# Patient Record
Sex: FEMALE | Race: WHITE | NOT HISPANIC OR LATINO | Employment: UNEMPLOYED | URBAN - METROPOLITAN AREA
[De-identification: names, ages, dates, MRNs, and addresses within clinical notes are randomized per-mention and may not be internally consistent; named-entity substitution may affect disease eponyms.]

---

## 2020-02-11 DIAGNOSIS — M79.7 FIBROMYALGIA: Primary | ICD-10-CM

## 2020-02-18 ENCOUNTER — CLINICAL SUPPORT (OUTPATIENT)
Dept: REHABILITATION | Facility: HOSPITAL | Age: 60
End: 2020-02-18
Payer: MEDICAID

## 2020-02-18 DIAGNOSIS — R52 CHRONIC GENERALIZED PAIN: Primary | ICD-10-CM

## 2020-02-18 DIAGNOSIS — G89.29 CHRONIC GENERALIZED PAIN: Primary | ICD-10-CM

## 2020-02-18 PROCEDURE — 97162 PT EVAL MOD COMPLEX 30 MIN: CPT

## 2020-02-18 NOTE — PLAN OF CARE
"[facility] OUTPATIENT THERAPY  Physical Therapy Initial Evaluation    Name: Rosanna Marie  Clinic Number: [id number]    Therapy Diagnosis:   Encounter Diagnosis   Name Primary?    Chronic generalized pain Yes     Physician: Surinder Caballero,*    Physician Orders: PT Eval and Treat   Medical Diagnosis from Referral: Fibromyalgia  Evaluation Date: 2/18/2020  Authorization Period Expiration: 2/10/2021  Plan of Care Expiration: 4/24/2020  Visit # / Visits authorized: 1/ 1 (freq: Ev + 12)    Precautions: Standard and blood thinners    Subjective   Date of onset: Exacerbation 2/11/2020    History of current condition - Rosanna reports: that she had a general follow up with her PCP and states that she has had a decline in her function due to her generalized pain.  She was referred to [facility] for Eval.  Patient adds that she has difficulty with prolonged standing, walking,  bending/lifting, difficulty with shopping, ADL's with sitting in tub for bathing as well as showering, disturbed sleep with a frequency of 3 x a night x 3 days a week.       Medical History:   No past medical history on file.    Surgical History:   Rosanna Marie  has no past surgical history on file.    Medications:   Rosanna currently has no medications in their medication list.    Allergies:   Review of patient's allergies indicates:  Allergies not on file     Imaging, none:     Prior Therapy: N/A  Social History:  lives with their family in a single story [facility] home.  Occupation: out of work  Prior Level of Function: Independent  Current Level of Function: Independent with pain and assistance by family.       Pain:  Current 4/10, worst 9/10, best 3/10   Location: Generalized pain generalized   Description: sun burn type and very sensitive.  Aggravating Factors: General activites  Easing Factors: pain medication    Pts goals: "I dont know why she sent me hear, she didn't know what else to do with me"    Objective     Posture: " forward head and slouched posture    General Objective           Flexibility      Muscle Left Right Comment         Pectoralis Minor Moderate tightness Moderate tightness    Quadriceps Moderate tightness Moderate tightness    Hamstrings -20* -20*    Piriformis Mod Tightness Mod Tightness                      ROM       Upper Ext. AROM AROM  Status Comment    Left Right              Flexion WFL WFL      Extension WFL WFL      ABduction WFL WFL      Medial Rotation WFL WFL      External Rotation WFL WFL                      Lower Ext.                Hip flexion WFL WFL      Hip Ext WFL WFL      Hip AB WFL WFL      Hip AD WFL WFL      Hip IR WFL WFL      Hip ER WFL WFL                      Knee flex WFL WFL      Knee ext WFL WFL      DF WFL WFL      PF WFL WFL                          MMT          Upper Ext.    Status Comment    Left Right              Shoulder Flexion 3+/5 3+/5      Shoulder Extension 4/5 4/5      Shoulder ABduction 3+/5 3+/5      Medial Rotation 4-/5 4-/5      External Rotation 4-/5 4-/5                      LT 2+/5 2+/5      MT 2+/5 2+/5      Rhomboids 3+/5 3+/5                      Lower Ext.                Hip flexion 3+/5 3+/5      Hip Ext 3+/5 3+/5      Hip AB 3+/5 3+/5      Hip AD 2+/5 2+/5      Hip IR 3+/5 3+/5      Hip ER 3+/5 3+/5      Glut Max 4-/5 4-/5              Knee flex        Knee ext 4/5 4/5      DF 4+/5 4+/5                       FUNCTIONAL TESTS      LEFS: 32/80 = 40% Function    UEFS: 53/80 = 66.25% Function           FUNCTIONAL MOBILITY        Balance: Non remarkable        Gait: Patient amb with diminished heel strike, toe off, TKE, hip ext, and with a flexed posture and head down.  Additionally, she has a decreased velocity and patricia.          Transfer: Mod I with UE use from chair and mat      Time In: 0805  Time Out: 0905      Assessment       Rosanna is a 59 y.o. female who is currently out of work and is presenting to OP Physical Therapy for initial evaluation on 2/18/2020  with a MD Dx of Fibromyalgia .  Patient exhibits increased pain, decreased flexibility, strength, activity saurabh and currently reports a limited functioinal level of 40% and 66.25% as per the LEFS and UEFS respectively.  All of which contributes to her general debility with limited function that she related with prolonged standing, walking, bending, ADLs- bathing, T/F into tub bottom, as well as difficulty with house hold and community mob.  The following co-morbid conditions/personal factors may affect the care plan: hx of fibromyalgia may limit her optimal ability to heal.  The patient's clinical characteristics are evolving.   Patient would benefit from skilled Physical Therapy to address HER noted deficits.      Pt prognosis is Fair.   Pt will benefit from skilled outpatient Physical Therapy to address the deficits stated above and in the chart below, provide pt/family education, and to maximize pt's level of independence.     Plan of care discussed with patient: Yes  Pt's spiritual, cultural and educational needs considered and patient is agreeable to the plan of care and goals as stated below:         Goals:  Patient will: STG/LTG Status   1a demonstrate initial HEP with use of handout prn in order to optimize Rx outcomes and max. Functional mob.in 4 visits. STG    2a relate her worst generalized pain to </= 8/10 in order to improve QoL for improved ability to perform her ADL's, prolonged standing, amb and to improve restful sleep in 6 visits. STG    3a demonstrate B hamstring length to </= - 15* (IE -20*) in order to improve TKE and heel strike with amb. In 6 visits.   STG    4a. Improve B LE gross strength to 4-/5 (IE 3+/5) in order to improve ability to perform prolonged standing, amb, ADLS, house hold and community mob. In 7 visits.   STG    5a Improve B UE gross strength to 4-/5 (IE 3+/5) in order to improve ability to perform her ADLs lifting , reaching and her day to day tasks in 7 visits.   STG    6a.  Improve her function to >/= 50% (IE 40%) as per the LEFS in order to improve ability to perform her ADLs, prolonged standing/walking, and her day to day tasks and community mob. in 7 visits.  STG    7a   Improve her function to >/= 75% (IE 66.25%) as per the UEFS in order to improve ability to perform her lifting , reaching and her day to day tasks in 7 visits. STG              1b demonstrate final HEP with use of handout prn in order to optimize Rx outcomes and max. Functional mob.by discharge. LTG    2b relate her worst generalized pain to </= 7/10 in order to improve QoL for improved ability to perform her ADL's, prolonged standing, amb and to improve restful sleep in 12 visits. LTG    3b demonstrate B hamstring length to </= - 10* (IE -20*) in order to improve TKE and heel strike with amb. In 12 visits.  LTG    4b  Improve B LE gross strength to 4/5 (IE 3+/5) in order to improve ability to perform prolonged standing, amb, ADLS, house hold and community mob. In 12 visits.   LTG    5b  Improve B UE gross strength to 4/5 (IE 3+/5) in order to improve ability to perform her ADLs lifting , reaching and her day to day tasks in 12 visits. LTG    6b  Improve her function to >/= 55% (IE 40%) as per the LEFS in order to improve ability to perform her ADLs, prolonged standing/walking, and her day to day tasks and community mob. in 12 visits. LTG    7b  Improve her function to >/= 80% (IE 66.25%) as per the UEFS in order to improve ability to perform her lifting , reaching and her day to day tasks in 12 visits. LTG                                 Anticipated Barriers for therapy: Med hx, lifestyle    Medical Necessity is demonstrated by the following  History  Co-morbidities and personal factors that may impact the plan of care Co-morbidities:   high BMI    Personal Factors:   lifestyle     low   Examination  Body Structures and Functions, activity limitations and participation restrictions that may impact the plan of care  Body Regions:   back  lower extremities  upper extremities  trunk    Body Systems:    ROM  strength  gait  transfers  transitions    Participation Restrictions:   None    Activity limitations:   Learning and applying knowledge  no deficits    General Tasks and Commands  no deficits    Communication  no deficits    Mobility  lifting and carrying objects  walking    Self care  washing oneself (bathing, drying, washing hands)    Domestic Life  shopping  cooking  doing house work (cleaning house, washing dishes, laundry)    Interactions/Relationships  no deficits    Life Areas  no deficits    Community and Social Life  community life  recreation and leisure         moderate   Clinical Presentation evolving clinical presentation with changing clinical characteristics moderate   Decision Making/ Complexity Score: moderate         Plan       POC valid from 2/18/2020 through 4/24/2020. 2Times a week for 12 visits, with treatments to consist of: Balance (13547): Improve overall coordination and balance, Cardiovascular, Closed Chain Strengthening, Core Stabilization, Flexibility (17559): improve muscle ROM, flexibility and  function, Home Exercise and Stretching, Patient Education, Plyometrics, Postural Awareness and  Training, Postural Stabilization, ROM Exercises (43056) : Passive or active activities to increase joint ROM, Strengthening  (74612): improve muscle strength and function., Therapeutic Exercise (02558): improve muscle strength, ROM, flexibility and muscle function., Gait Training (17630) : Improve overall gait function including stair climbing, Cross Friction Massage, Manual Stretching (82721): passive or active stretching to improve muscle length and function., Strain/Counter-Strain, Manual Traction, Myofascial Release , Peripheral Joint Mobilization, Soft Tissue Mobs (46376): increase ROM, tissue length, joint mechanics and modulate pain., Spine Mobilization  (90068): increase ROM, tissue length,  joint mechanics and modulate pain., Massage (77011):, Combo E-Stim/Ultrasound, Cryotherapy (53473: Application of cold to decrease local swelling and decrease pain., Heat - 99925:  Application of heat to increase local circulation and decrease pain., Hi-Volt E-Stim  (): Application of electrical stimulation to modulate pain., IFC E-Stim (): Application of electrical stimulation to modulate pain., Mechanical Traction (23700), Micro-Current, Premodulated E-Stim  (): Application of electrical stimulation to modulate pain., TENs E-Stim  (): Application of electrical stimulation to modulate pain., Ultrasound (94544): increase local circulation, improve tissue healing time and modulate pain., Whirlpool (44714): increase local tissue circulation, improve elasticity of tissues, increased blood flow for improved muscle strength, ROM, flexiblity, and function., NMES E-stim ():  Application of electrical stimulation for motor learning and control., Iontophoresis (07546), NMR (45965): re-education of movement, balance, coordination, kinesthetic sense, posture and proprioception, Self Care & Home Management (95853) - Self-care/home management training (e.g., activities of daily living [ADL] and compensatory training, meal preparation, safety procedures, and instructions in use of assistive technology devices/adaptive equipment), direct one-on-one contact (15 minutes), Therapeutic Activity (58053):  Use of dynamic activities to improve functional performance..        Aram Lanza, PT        I CERTIFY THE NEED FOR THESE SERVICES FURNISHED UNDER THIS PLAN OF TREATMENT AND WHILE UNDER MY CARE     Physician's comments:           Physician's Signature: ___________________________________________________

## 2020-02-27 ENCOUNTER — CLINICAL SUPPORT (OUTPATIENT)
Dept: REHABILITATION | Facility: HOSPITAL | Age: 60
End: 2020-02-27
Payer: MEDICAID

## 2020-02-27 DIAGNOSIS — R52 CHRONIC GENERALIZED PAIN: ICD-10-CM

## 2020-02-27 DIAGNOSIS — G89.29 CHRONIC GENERALIZED PAIN: ICD-10-CM

## 2020-02-27 PROCEDURE — 97110 THERAPEUTIC EXERCISES: CPT | Mod: CQ

## 2020-02-27 NOTE — PROGRESS NOTES
"[facility] OUTPATIENT THERAPY AND WELLNESS  Outpatient Physical Therapy Daily Treatment Note      Name: Rosanna Marie  Clinic Number: [id number]  Visit Date: 2/27/2020    Therapy Diagnosis: No diagnosis found.    Physician: Surinder Caballero,*     Physician Orders: PT Eval and Treat   Medical Diagnosis from Referral: Fibromyalgia  Evaluation Date: 2/18/2020  Authorization Period Expiration: 2/10/2021  Plan of Care Expiration: 4/24/2020  Visit # / Visits authorized: 2/13 (freq: Ev + 12)    Time In: 1100  Time Out: 1200  Total Billable Timed: 23  Total Billable Un-timed: 37    Precautions: Standard and blood thinners    Subjective     The patient presents to therapy stating, "I am hurting and I am depressed." Reports her MD is aware of her depression.    Response to previous treatment: first visit post IE  Functional change: N/A    Pain: 5/10  Location: right LE and hip      Objective     Rosanna received therapeutic exercises to develop strength, endurance, ROM, flexibility, posture and core stabilization for 54 minutes including:  Recumbent bike x 5 minutes  UBE 2 fwd and 2 bkward  HS stretch 3 x 30 sec  SLR 3 x 10 reps  Bridges with add squeezes 3 x 10 reps  ER clams 3 x 10 reps  IR clams 3 x 10 reps  Sidelying hip abd 3 x 10 reps      Patient Education and HEP     She was provided HEP this visit.    Education provided:   - HEP    Written Home Exercises Provided: yes.  Exercises were reviewed and Rosanna was able to demonstrate them prior to the end of the session.  Rosanna demonstrated good  understanding of the education provided.     See EMR under Patient Instructions for exercises provided 2/27/2020.    Assessment     The patient tolerated her first visit post IE well without reporting provocation of pain. Tx was focused on LE strengthening this am. She req's cueing to improve form and execution of there-ex /c improvements noted. Pt will continue to benefit from skilled PT to improve " global strength to allow pt to return to PLOF without pain or limitations.     Pt will continue to benefit from skilled outpatient physical therapy to address the deficits listed in the problem list box on initial evaluation, provide pt/family education and to maximize pt's level of independence in the home and community environment.     Rosanna is progressing well towards her goals.   Pt prognosis is Fair.     Pt's spiritual, cultural and educational needs considered and pt agreeable to plan of care and goals.    Anticipated barriers to physical therapy: Med hx, lifestyle    Goals:  Patient will: STG/LTG Status   1a demonstrate initial HEP with use of handout prn in order to optimize Rx outcomes and max. Functional mob.in 4 visits. STG     2a relate her worst generalized pain to </= 8/10 in order to improve QoL for improved ability to perform her ADL's, prolonged standing, amb and to improve restful sleep in 6 visits. STG     3a demonstrate B hamstring length to </= - 15* (IE -20*) in order to improve TKE and heel strike with amb. In 6 visits.   STG     4a. Improve B LE gross strength to 4-/5 (IE 3+/5) in order to improve ability to perform prolonged standing, amb, ADLS, house hold and community mob. In 7 visits.   STG     5a Improve B UE gross strength to 4-/5 (IE 3+/5) in order to improve ability to perform her ADLs lifting , reaching and her day to day tasks in 7 visits.   STG     6a. Improve her function to >/= 50% (IE 40%) as per the LEFS in order to improve ability to perform her ADLs, prolonged standing/walking, and her day to day tasks and community mob. in 7 visits.  STG     7a   Improve her function to >/= 75% (IE 66.25%) as per the UEFS in order to improve ability to perform her lifting , reaching and her day to day tasks in 7 visits. STG                     1b demonstrate final HEP with use of handout prn in order to optimize Rx outcomes and max. Functional mob.by discharge. LTG     2b relate her worst  generalized pain to </= 7/10 in order to improve QoL for improved ability to perform her ADL's, prolonged standing, amb and to improve restful sleep in 12 visits. LTG     3b demonstrate B hamstring length to </= - 10* (IE -20*) in order to improve TKE and heel strike with amb. In 12 visits.  LTG     4b  Improve B LE gross strength to 4/5 (IE 3+/5) in order to improve ability to perform prolonged standing, amb, ADLS, house hold and community mob. In 12 visits.   LTG     5b  Improve B UE gross strength to 4/5 (IE 3+/5) in order to improve ability to perform her ADLs lifting , reaching and her day to day tasks in 12 visits. LTG     6b  Improve her function to >/= 55% (IE 40%) as per the LEFS in order to improve ability to perform her ADLs, prolonged standing/walking, and her day to day tasks and community mob. in 12 visits. LTG     7b  Improve her function to >/= 80% (IE 66.25%) as per the UEFS in order to improve ability to perform her lifting , reaching and her day to day tasks in 12 visits. LTG           Plan     Continue with the plan of care established per initial evaluation    Faith Sweeney, PTA

## 2020-03-02 ENCOUNTER — CLINICAL SUPPORT (OUTPATIENT)
Dept: REHABILITATION | Facility: HOSPITAL | Age: 60
End: 2020-03-02
Payer: MEDICAID

## 2020-03-02 DIAGNOSIS — G89.29 CHRONIC GENERALIZED PAIN: ICD-10-CM

## 2020-03-02 DIAGNOSIS — R52 CHRONIC GENERALIZED PAIN: ICD-10-CM

## 2020-03-02 PROCEDURE — 97110 THERAPEUTIC EXERCISES: CPT | Mod: CQ

## 2020-03-02 NOTE — PROGRESS NOTES
[facility] OUTPATIENT THERAPY AND WELLNESS  Outpatient Physical Therapy Daily Treatment Note      Name: Rosanna Marie  Clinic Number: [id number]  Visit Date: 3/2/2020    Therapy Diagnosis:   Encounter Diagnosis   Name Primary?    Chronic generalized pain        Physician: Surinder Caballero,*     Physician Orders: PT Eval and Treat   Medical Diagnosis from Referral: Fibromyalgia  Evaluation Date: 2/18/2020  Authorization Period Expiration: 2/10/2021  Plan of Care Expiration: 4/24/2020  Visit # / Visits authorized: 3/13 (freq: Ev + 12)    Time In: 1300  Time Out: 1400  Total Billable Timed: 54  Total Billable Un-timed:     Precautions: Standard and blood thinners    Subjective     Pt reports good response to last tx. She verbalizes a reduction in pain this am.     Response to previous treatment: good response  Functional change: reduction in painful symptoms    Pain: 2/10  Location: right LE and hip      Objective     Rosanna received therapeutic exercises to develop strength, endurance, ROM, flexibility, posture and core stabilization for 54 minutes including:  Recumbent bike x 5 minutes  UBE 2 fwd and 2 bkward  HS stretch 3 x 30 sec  Piriformis stretch   SLR 3 x 10 reps 1.5# AW  Bridges with add squeezes 3 x 10 reps  ER clams 3 x 10 reps Pink TB  IR clams 3 x 10 reps 1.5 # AW  Sidelying hip abd 3 x 10 reps 1.5# AW  +cybex row 2.5 plates 2 x 10 reps  +cybex lat pull downs 3 plates 3 x 10 reps      Patient Education and HEP     She verbalizes compliance with HEP.    Education provided:   - HEP    Written Home Exercises Provided: yes.  Exercises were reviewed and Rosanna was able to demonstrate them prior to the end of the session.  Rosanna demonstrated good  understanding of the education provided.     See EMR under Patient Instructions for exercises provided 2/27/2020.    Assessment     Pt tolerated incorporating UE there-ex and increased resistance this day without reports of provocation  of pain. She req'd cueing to improve form and execution of SL hip abd. She responded well to cueing /c improvement noted. Pt will continue to benefit from skilled PT to improve strength and endurance to improve her ability to tolerate prolonged standing and walking without onset of pain or limitations.     Pt will continue to benefit from skilled outpatient physical therapy to address the deficits listed in the problem list box on initial evaluation, provide pt/family education and to maximize pt's level of independence in the home and community environment.     Rosanna is progressing well towards her goals.   Pt prognosis is Fair.     Pt's spiritual, cultural and educational needs considered and pt agreeable to plan of care and goals.    Anticipated barriers to physical therapy: Med hx, lifestyle    Goals:  Patient will: STG/LTG Status   1a demonstrate initial HEP with use of handout prn in order to optimize Rx outcomes and max. Functional mob.in 4 visits. STG     2a relate her worst generalized pain to </= 8/10 in order to improve QoL for improved ability to perform her ADL's, prolonged standing, amb and to improve restful sleep in 6 visits. STG     3a demonstrate B hamstring length to </= - 15* (IE -20*) in order to improve TKE and heel strike with amb. In 6 visits.   STG     4a. Improve B LE gross strength to 4-/5 (IE 3+/5) in order to improve ability to perform prolonged standing, amb, ADLS, house hold and community mob. In 7 visits.   STG     5a Improve B UE gross strength to 4-/5 (IE 3+/5) in order to improve ability to perform her ADLs lifting , reaching and her day to day tasks in 7 visits.   STG     6a. Improve her function to >/= 50% (IE 40%) as per the LEFS in order to improve ability to perform her ADLs, prolonged standing/walking, and her day to day tasks and community mob. in 7 visits.  STG     7a   Improve her function to >/= 75% (IE 66.25%) as per the UEFS in order to improve ability to perform her  lifting , reaching and her day to day tasks in 7 visits. STG                     1b demonstrate final HEP with use of handout prn in order to optimize Rx outcomes and max. Functional mob.by discharge. LTG     2b relate her worst generalized pain to </= 7/10 in order to improve QoL for improved ability to perform her ADL's, prolonged standing, amb and to improve restful sleep in 12 visits. LTG     3b demonstrate B hamstring length to </= - 10* (IE -20*) in order to improve TKE and heel strike with amb. In 12 visits.  LTG     4b  Improve B LE gross strength to 4/5 (IE 3+/5) in order to improve ability to perform prolonged standing, amb, ADLS, house hold and community mob. In 12 visits.   LTG     5b  Improve B UE gross strength to 4/5 (IE 3+/5) in order to improve ability to perform her ADLs lifting , reaching and her day to day tasks in 12 visits. LTG     6b  Improve her function to >/= 55% (IE 40%) as per the LEFS in order to improve ability to perform her ADLs, prolonged standing/walking, and her day to day tasks and community mob. in 12 visits. LTG     7b  Improve her function to >/= 80% (IE 66.25%) as per the UEFS in order to improve ability to perform her lifting , reaching and her day to day tasks in 12 visits. LTG           Plan     Continue with the plan of care established per initial evaluation    Faith Sweeney, PTA

## 2020-03-06 ENCOUNTER — DOCUMENTATION ONLY (OUTPATIENT)
Dept: REHABILITATION | Facility: HOSPITAL | Age: 60
End: 2020-03-06

## 2020-03-06 NOTE — PROGRESS NOTES
PT/PTA face to face conference completed regarding patient treatment plan and progress towards established goals. Treatment will be continued as described in initial report/ evaluation and treatment/progress notes. Patient will be seen by physical therapist every sixth visit or minimally once per month.       Faith Sweeney, PTA

## 2020-03-09 ENCOUNTER — CLINICAL SUPPORT (OUTPATIENT)
Dept: REHABILITATION | Facility: HOSPITAL | Age: 60
End: 2020-03-09
Payer: MEDICAID

## 2020-03-09 DIAGNOSIS — G89.29 CHRONIC GENERALIZED PAIN: Primary | ICD-10-CM

## 2020-03-09 DIAGNOSIS — R52 CHRONIC GENERALIZED PAIN: Primary | ICD-10-CM

## 2020-03-09 PROCEDURE — 97110 THERAPEUTIC EXERCISES: CPT

## 2020-03-09 NOTE — PROGRESS NOTES
[facility]  Outpatient Physical Therapy Daily Treatment Note      Name: Rosanna Marie  Clinic Number: [id number]  Visit Date: 3/9/2020    Therapy Diagnosis:   Encounter Diagnosis   Name Primary?    Chronic generalized pain Yes       Physician: Surinder Caballero,*     Physician Orders: PT Eval and Treat   Medical Diagnosis from Referral: Fibromyalgia  Evaluation Date: 2/18/2020  Authorization Period Expiration: 2/10/2021  Plan of Care Expiration: 4/24/2020  Visit # / Visits authorized: 4/13 (freq: Ev + 12)    Time In: 1000  Time Out: 1105      Precautions: Standard and blood thinners    Subjective     Pt reports she currently has a 6/10 pain primarily to her B UE.      Response to previous treatment: good response  Functional change: reduction in painful symptoms    Pain: 6/10  Location: right LE and hip      Objective     Rosanna received therapeutic exercises to develop strength, endurance, ROM, flexibility, posture and core stabilization for 56 minutes including:    · Recumbent bike x 9 minutes  · UBE 2 fwd and 2 bkward NP  · HS stretch 60 sec x 2  · Piriformis stretch 60 sec x 2  · SLR 3 x 10 reps 1.5# AW  · Bridges with add squeezes 3 x 10 reps  · ER clams 3 x 10 reps Pink TB  · IR clams 3 x 10 reps 1.5 # AW  · Sidelying hip abd 3 x 10 reps 0# AW  · cybex row 2.5 plates 2 x 10 reps  · cybex lat pull downs 3 plates 3 x 10 reps          Patient received hot pack for 10 minutes to LB for analgesia of generalize back pain following Rx.      Patient Education and HEP     She verbalizes compliance with HEP.    Education provided:   - HEP    Written Home Exercises Provided: Patient instructed to cont prior HEP.  Exercises were reviewed and Rosanna was able to demonstrate them prior to the end of the session.  Rosanna demonstrated good  understanding of the education provided.     See EMR under Patient Instructions for exercises provided 2/27/2020.    Assessment     Patient participated with PT to  address her pain, strength activity saurabh and mobility.  She was able to perform her noted TE with no c/o of increased pain, but did required frequent cues for improved exs quality and performance.  Her SL AB resistance was removed as she had poor quality and difficulty with no resistance this day, performance of AB improved with no resistance.  Patient is expected to progress to her established goals with cont PT Rx.        Pt will continue to benefit from skilled outpatient physical therapy to address the deficits listed in the problem list box on initial evaluation, provide pt/family education and to maximize pt's level of independence in the home and community environment.     Rosanna is progressing well towards her goals.   Pt prognosis is Fair.     Pt's spiritual, cultural and educational needs considered and pt agreeable to plan of care and goals.    Anticipated barriers to physical therapy: Med hx, lifestyle    Goals:  Patient will: STG/LTG Status   1a demonstrate initial HEP with use of handout prn in order to optimize Rx outcomes and max. Functional mob.in 4 visits. STG In progress 3/9/2020    2a relate her worst generalized pain to </= 8/10 in order to improve QoL for improved ability to perform her ADL's, prolonged standing, amb and to improve restful sleep in 6 visits. STG  In progress 3/9/2020   3a demonstrate B hamstring length to </= - 15* (IE -20*) in order to improve TKE and heel strike with amb. In 6 visits.   STG In progress 3/9/2020    4a. Improve B LE gross strength to 4-/5 (IE 3+/5) in order to improve ability to perform prolonged standing, amb, ADLS, house hold and community mob. In 7 visits.   STG In progress 3/9/2020    5a Improve B UE gross strength to 4-/5 (IE 3+/5) in order to improve ability to perform her ADLs lifting , reaching and her day to day tasks in 7 visits.   STG In progress 3/9/2020    6a. Improve her function to >/= 50% (IE 40%) as per the LEFS in order to improve ability to  perform her ADLs, prolonged standing/walking, and her day to day tasks and community mob. in 7 visits.  STG In progress 3/9/2020    7a   Improve her function to >/= 75% (IE 66.25%) as per the UEFS in order to improve ability to perform her lifting , reaching and her day to day tasks in 7 visits. STG In progress 3/9/2020                    1b demonstrate final HEP with use of handout prn in order to optimize Rx outcomes and max. Functional mob.by discharge. LTG     2b relate her worst generalized pain to </= 7/10 in order to improve QoL for improved ability to perform her ADL's, prolonged standing, amb and to improve restful sleep in 12 visits. LTG     3b demonstrate B hamstring length to </= - 10* (IE -20*) in order to improve TKE and heel strike with amb. In 12 visits.  LTG     4b  Improve B LE gross strength to 4/5 (IE 3+/5) in order to improve ability to perform prolonged standing, amb, ADLS, house hold and community mob. In 12 visits.   LTG     5b  Improve B UE gross strength to 4/5 (IE 3+/5) in order to improve ability to perform her ADLs lifting , reaching and her day to day tasks in 12 visits. LTG     6b  Improve her function to >/= 55% (IE 40%) as per the LEFS in order to improve ability to perform her ADLs, prolonged standing/walking, and her day to day tasks and community mob. in 12 visits. LTG     7b  Improve her function to >/= 80% (IE 66.25%) as per the UEFS in order to improve ability to perform her lifting , reaching and her day to day tasks in 12 visits. LTG           Plan       Continue with the plan of care and progress as saurabh Lanza, PT

## 2020-03-12 ENCOUNTER — CLINICAL SUPPORT (OUTPATIENT)
Dept: REHABILITATION | Facility: HOSPITAL | Age: 60
End: 2020-03-12
Payer: MEDICAID

## 2020-03-12 DIAGNOSIS — R52 CHRONIC GENERALIZED PAIN: ICD-10-CM

## 2020-03-12 DIAGNOSIS — G89.29 CHRONIC GENERALIZED PAIN: ICD-10-CM

## 2020-03-12 PROCEDURE — 97110 THERAPEUTIC EXERCISES: CPT | Mod: CQ

## 2020-03-12 NOTE — PROGRESS NOTES
"[facility]  Outpatient Physical Therapy Daily Treatment Note      Name: Rosanna Marie  Clinic Number: [id number]  Visit Date: 3/12/2020    Therapy Diagnosis:     Encounter Diagnosis   Name Primary?    Chronic generalized pain Yes          Physician: Surinder Caballero,*     Physician Orders: PT Eval and Treat   Medical Diagnosis from Referral: Fibromyalgia  Evaluation Date: 2/18/2020  Authorization Period Expiration: 2/10/2021  Plan of Care Expiration: 4/24/2020  Visit # / Visits authorized: 5/13 (freq: Ev + 12)    Time In: 1000  Time Out: 1105      Precautions: Standard and blood thinners    Subjective     Pt reports decreased pain today. "Last night was the best night of sleep that I've had in days."     Response to previous treatment: good response  Functional change: improved sleep    Pain: 2/10  Location: right LE and hip      Objective     Rosanna received therapeutic exercises to develop strength, endurance, ROM, flexibility, posture and core stabilization for 56 minutes including:    · Recumbent bike x 9 minutes  · UBE 2 fwd and 2 bkward NP  · HS stretch 60 sec x 2  · Piriformis stretch 60 sec x 2  · SLR 3 x 10 reps 1.5# AW  · Bridges with add squeezes 3 x 10 reps  · ER clams 3 x 10 reps Pink TB  · IR clams 3 x 10 reps 1.5 # AW  · Sidelying hip abd 3 x 10 reps 0# AW  · cybex row 2.5 plates 2 x 10 reps  · cybex lat pull downs 3 plates 3 x 10 reps          Patient received hot pack for 0 minutes to LB for analgesia of generalize back pain following Rx.      Patient Education and HEP     She verbalizes compliance with HEP.    Education provided:   - HEP    Written Home Exercises Provided: Patient instructed to cont prior HEP.  Exercises were reviewed and Rosanna was able to demonstrate them prior to the end of the session.  Rosanna demonstrated good  understanding of the education provided.     See EMR under Patient Instructions for exercises provided 2/27/2020.    Assessment     Patient " participated with PT to address her pain, strength activity saurabh and mobility.  She was able to perform her noted TE with no c/o of increased pain, but did required frequent cues for improved exs quality and performance.  Patient is expected to progress to her established goals with cont PT Rx.        Pt will continue to benefit from skilled outpatient physical therapy to address the deficits listed in the problem list box on initial evaluation, provide pt/family education and to maximize pt's level of independence in the home and community environment.     Rosanna is progressing well towards her goals.   Pt prognosis is Fair.     Pt's spiritual, cultural and educational needs considered and pt agreeable to plan of care and goals.    Anticipated barriers to physical therapy: Med hx, lifestyle    Goals:  Patient will: STG/LTG Status   1a demonstrate initial HEP with use of handout prn in order to optimize Rx outcomes and max. Functional mob.in 4 visits. STG In progress 3/12/2020    2a relate her worst generalized pain to </= 8/10 in order to improve QoL for improved ability to perform her ADL's, prolonged standing, amb and to improve restful sleep in 6 visits. STG  In progress 3/12/2020   3a demonstrate B hamstring length to </= - 15* (IE -20*) in order to improve TKE and heel strike with amb. In 6 visits.   STG In progress 3/12/2020    4a. Improve B LE gross strength to 4-/5 (IE 3+/5) in order to improve ability to perform prolonged standing, amb, ADLS, house hold and community mob. In 7 visits.   STG In progress 3/12/2020    5a Improve B UE gross strength to 4-/5 (IE 3+/5) in order to improve ability to perform her ADLs lifting , reaching and her day to day tasks in 7 visits.   STG In progress 3/12/2020    6a. Improve her function to >/= 50% (IE 40%) as per the LEFS in order to improve ability to perform her ADLs, prolonged standing/walking, and her day to day tasks and community mob. in 7 visits.  STG In progress  3/12/2020    7a   Improve her function to >/= 75% (IE 66.25%) as per the UEFS in order to improve ability to perform her lifting , reaching and her day to day tasks in 7 visits. STG In progress 3/12/2020                    1b demonstrate final HEP with use of handout prn in order to optimize Rx outcomes and max. Functional mob.by discharge. LTG     2b relate her worst generalized pain to </= 7/10 in order to improve QoL for improved ability to perform her ADL's, prolonged standing, amb and to improve restful sleep in 12 visits. LTG     3b demonstrate B hamstring length to </= - 10* (IE -20*) in order to improve TKE and heel strike with amb. In 12 visits.  LTG     4b  Improve B LE gross strength to 4/5 (IE 3+/5) in order to improve ability to perform prolonged standing, amb, ADLS, house hold and community mob. In 12 visits.   LTG     5b  Improve B UE gross strength to 4/5 (IE 3+/5) in order to improve ability to perform her ADLs lifting , reaching and her day to day tasks in 12 visits. LTG     6b  Improve her function to >/= 55% (IE 40%) as per the LEFS in order to improve ability to perform her ADLs, prolonged standing/walking, and her day to day tasks and community mob. in 12 visits. LTG     7b  Improve her function to >/= 80% (IE 66.25%) as per the UEFS in order to improve ability to perform her lifting , reaching and her day to day tasks in 12 visits. LTG           Plan       Continue with the plan of care and progress as saurabh Beverly, PTA

## 2020-03-24 ENCOUNTER — TELEPHONE (OUTPATIENT)
Dept: REHABILITATION | Facility: HOSPITAL | Age: 60
End: 2020-03-24

## 2020-03-24 NOTE — TELEPHONE ENCOUNTER
Contacted concerning missed therapy session on Monday 3/23. PT informed patient that 2/2 COVID 19 we are temporarily postponing appointments for patients who may be at increased risk to attend in-person therapy or where therapy is no critically needed at this time. PT informed patient that Thursday 3/26 will be her last scheduled tx session. At this session, she will be discharged from skilled PT services at this time 2/2 COVID 19 concern and being independent with her HEP. PT informed patient to try her best to make this appointment and to contact clinic if she has any concerns or questions.

## 2020-03-26 ENCOUNTER — DOCUMENTATION ONLY (OUTPATIENT)
Dept: REHABILITATION | Facility: HOSPITAL | Age: 60
End: 2020-03-26

## 2020-04-03 ENCOUNTER — DOCUMENTATION ONLY (OUTPATIENT)
Dept: REHABILITATION | Facility: HOSPITAL | Age: 60
End: 2020-04-03

## 2020-04-06 ENCOUNTER — TELEPHONE (OUTPATIENT)
Dept: REHABILITATION | Facility: HOSPITAL | Age: 60
End: 2020-04-06

## 2020-04-06 ENCOUNTER — DOCUMENTATION ONLY (OUTPATIENT)
Dept: REHABILITATION | Facility: HOSPITAL | Age: 60
End: 2020-04-06

## 2020-04-06 PROBLEM — G89.29 CHRONIC GENERALIZED PAIN: Status: RESOLVED | Noted: 2020-02-18 | Resolved: 2020-04-06

## 2020-04-06 PROBLEM — R52 CHRONIC GENERALIZED PAIN: Status: RESOLVED | Noted: 2020-02-18 | Resolved: 2020-04-06

## 2020-04-06 NOTE — PROGRESS NOTES
Outpatient Therapy Discharge Summary     Name: Rosanna Marie  [facility] Number: [id number]    Therapy Diagnosis: No diagnosis found.  Physician: No ref. provider found    Physician Orders: PT Eval and Rx  Medical Diagnosis: Fibromyalgia  Evaluation Date: 2/18/2020      Date of Last visit: 3/12/2020  Total Visits Received: 5      Assessment      Patient was initially evaluated on 2/18/2020 with a MD Dx of Fibromyalgia.  She had five total visits with her most recent occurring on 3/12/2020.  Following that her remaining visits were postponed due the the increased risk of COVID-19.  Currently the risk of COVID-19 remains high, therefore the patient was contacted and notified that she would be discharged from OPPT and that once the risk was reduced she could return for a follow another P.T Eval.  At the time of her most recent visit all of her STG were in progress and she had not met any of her goals.  The patient is discharged from OPPT.      Discharge reason: Other:  As noted    Plan   This patient is discharged from Physical Therapy and is to cont with their HEP and MD follow up PRN.        Aram Lanza, PT

## 2020-04-06 NOTE — TELEPHONE ENCOUNTER
PT called and left VM regarding current discharge from OPPT due to increased risk of COVID-19 exposure.  Additionally, once the risk was reduced that she could return to PT for a new evaluation.      Aram Lanza P.T.

## 2020-10-25 ENCOUNTER — HOSPITAL ENCOUNTER (EMERGENCY)
Facility: HOSPITAL | Age: 60
Discharge: HOME | End: 2020-10-25
Attending: EMERGENCY MEDICINE
Payer: MEDICAID

## 2020-10-25 VITALS
TEMPERATURE: 98 F | BODY MASS INDEX: 23.32 KG/M2 | SYSTOLIC BLOOD PRESSURE: 153 MMHG | RESPIRATION RATE: 18 BRPM | HEIGHT: 65 IN | DIASTOLIC BLOOD PRESSURE: 72 MMHG | WEIGHT: 140 LBS | HEART RATE: 62 BPM

## 2020-10-25 DIAGNOSIS — R07.9 CHEST PAIN: ICD-10-CM

## 2020-10-25 DIAGNOSIS — R19.7 DIARRHEA OF PRESUMED INFECTIOUS ORIGIN: Primary | ICD-10-CM

## 2020-10-25 LAB
ANION GAP SERPL CALC-SCNC: 10 MMOL/L (ref 8–16)
BASOPHILS # BLD AUTO: 0.06 K/UL (ref 0–0.2)
BASOPHILS NFR BLD: 0.4 % (ref 0–1.9)
BUN SERPL-MCNC: 8 MG/DL (ref 6–20)
CALCIUM SERPL-MCNC: 9.9 MG/DL (ref 8.7–10.5)
CHLORIDE SERPL-SCNC: 105 MMOL/L (ref 95–110)
CO2 SERPL-SCNC: 26 MMOL/L (ref 23–29)
CREAT SERPL-MCNC: 0.8 MG/DL (ref 0.5–1.4)
DIFFERENTIAL METHOD: ABNORMAL
EOSINOPHIL # BLD AUTO: 0.4 K/UL (ref 0–0.5)
EOSINOPHIL NFR BLD: 2.6 % (ref 0–8)
ERYTHROCYTE [DISTWIDTH] IN BLOOD BY AUTOMATED COUNT: 14.1 % (ref 11.5–14.5)
EST. GFR  (AFRICAN AMERICAN): >60 ML/MIN/1.73 M^2
EST. GFR  (NON AFRICAN AMERICAN): >60 ML/MIN/1.73 M^2
GLUCOSE SERPL-MCNC: 103 MG/DL (ref 70–110)
HCT VFR BLD AUTO: 46 % (ref 37–48.5)
HGB BLD-MCNC: 14.8 G/DL (ref 12–16)
IMM GRANULOCYTES # BLD AUTO: 0.06 K/UL (ref 0–0.04)
IMM GRANULOCYTES NFR BLD AUTO: 0.4 % (ref 0–0.5)
LYMPHOCYTES # BLD AUTO: 6.2 K/UL (ref 1–4.8)
LYMPHOCYTES NFR BLD: 41.8 % (ref 18–48)
MCH RBC QN AUTO: 28.7 PG (ref 27–31)
MCHC RBC AUTO-ENTMCNC: 32.2 G/DL (ref 32–36)
MCV RBC AUTO: 89 FL (ref 82–98)
MONOCYTES # BLD AUTO: 1 K/UL (ref 0.3–1)
MONOCYTES NFR BLD: 6.5 % (ref 4–15)
NEUTROPHILS # BLD AUTO: 7.1 K/UL (ref 1.8–7.7)
NEUTROPHILS NFR BLD: 48.3 % (ref 38–73)
NRBC BLD-RTO: 0 /100 WBC
PLATELET # BLD AUTO: 299 K/UL (ref 150–350)
PMV BLD AUTO: 9.9 FL (ref 9.2–12.9)
POTASSIUM SERPL-SCNC: 3.9 MMOL/L (ref 3.5–5.1)
RBC # BLD AUTO: 5.15 M/UL (ref 4–5.4)
SODIUM SERPL-SCNC: 141 MMOL/L (ref 136–145)
TROPONIN I SERPL DL<=0.01 NG/ML-MCNC: 0.02 NG/ML (ref 0–0.03)
WBC # BLD AUTO: 14.81 K/UL (ref 3.9–12.7)

## 2020-10-25 PROCEDURE — 99284 EMERGENCY DEPT VISIT MOD MDM: CPT | Mod: 25

## 2020-10-25 PROCEDURE — 36415 COLL VENOUS BLD VENIPUNCTURE: CPT

## 2020-10-25 PROCEDURE — 25000003 PHARM REV CODE 250: Performed by: EMERGENCY MEDICINE

## 2020-10-25 PROCEDURE — 85025 COMPLETE CBC W/AUTO DIFF WBC: CPT

## 2020-10-25 PROCEDURE — 84484 ASSAY OF TROPONIN QUANT: CPT

## 2020-10-25 PROCEDURE — 93005 ELECTROCARDIOGRAM TRACING: CPT

## 2020-10-25 PROCEDURE — 96360 HYDRATION IV INFUSION INIT: CPT

## 2020-10-25 PROCEDURE — 80048 BASIC METABOLIC PNL TOTAL CA: CPT

## 2020-10-25 PROCEDURE — 93010 EKG 12-LEAD: ICD-10-PCS | Mod: ,,, | Performed by: SPECIALIST

## 2020-10-25 PROCEDURE — 93010 ELECTROCARDIOGRAM REPORT: CPT | Mod: ,,, | Performed by: SPECIALIST

## 2020-10-25 RX ORDER — SOTALOL HYDROCHLORIDE 120 MG/1
80 TABLET ORAL EVERY 12 HOURS
COMMUNITY

## 2020-10-25 RX ORDER — GABAPENTIN 100 MG/1
100 CAPSULE ORAL 3 TIMES DAILY
COMMUNITY
End: 2022-02-15 | Stop reason: CLARIF

## 2020-10-25 RX ADMIN — SODIUM CHLORIDE 1000 ML: 0.9 INJECTION, SOLUTION INTRAVENOUS at 05:10

## 2020-10-25 NOTE — ED PROVIDER NOTES
"Encounter Date: 10/25/2020    SCRIBE #1 NOTE: I, Lazara Craft, am scribing for, and in the presence of, Dr. Holliday.       History     Chief Complaint   Patient presents with    Diarrhea     x several days; H/A       Time seen by provider: 5:12 PM on 10/25/2020    Rosanna Marie is a 59 y.o. female with PMHx of fibromyalgia, A-fib and diverticulitis who presents to the ED with an onset of diarrhea for 2 days.  She is currently taking Apixaban and has an ablation for A-fib.  Patient describes her diarrhea as watery and loose with multiple episodes per day.  She confirms left sided CP for 2 months that radiates to her left arm and elbow and light-headedness exacerbated with bending over.  She also confirms having "rotten teeth" for which she has a dental appointment tomorrow.  The patient denies bloody stools, N/V, SOB, LOC, painful urination, swelling, fevers or any other symptoms at this time.  She also denies new suspicious food intake or positive sick contact.  PSHx includes ablation and cholecystectomy.      The history is provided by the patient.     Review of patient's allergies indicates:  No Known Allergies  Past Medical History:   Diagnosis Date    Atrial fibrillation     Diverticulitis     Fibromyalgia      Past Surgical History:   Procedure Laterality Date    ABLATION      for A Fib    CHOLECYSTECTOMY      OTHER SURGICAL HISTORY      cone biopsy of cervix     History reviewed. No pertinent family history.  Social History     Tobacco Use    Smoking status: Current Every Day Smoker     Packs/day: 1.00     Types: Cigarettes    Smokeless tobacco: Never Used   Substance Use Topics    Alcohol use: Not on file    Drug use: Not on file     Review of Systems   Constitutional: Negative for activity change, appetite change and fever.   HENT: Positive for dental problem. Negative for sore throat.    Respiratory: Negative for shortness of breath.    Cardiovascular: Positive for chest pain. Negative " for leg swelling.   Gastrointestinal: Positive for diarrhea. Negative for blood in stool, nausea and vomiting.   Genitourinary: Negative for dysuria.   Musculoskeletal: Negative for back pain and joint swelling.   Skin: Negative for rash.   Neurological: Positive for light-headedness. Negative for syncope and weakness.   Hematological: Does not bruise/bleed easily.       Physical Exam     Initial Vitals [10/25/20 1644]   BP Pulse Resp Temp SpO2   (!) 153/72 62 18 98.1 °F (36.7 °C) --      MAP       --         Physical Exam    Nursing note and vitals reviewed.  Constitutional: She appears well-developed and well-nourished. She is not diaphoretic. No distress.   HENT:   Head: Normocephalic and atraumatic.   Mouth/Throat: Oropharynx is clear and moist.   Eyes: Conjunctivae are normal.   Neck: Neck supple.   Cardiovascular: Normal rate, regular rhythm, normal heart sounds and intact distal pulses. Exam reveals no gallop and no friction rub.    No murmur heard.  Pulses:       Dorsalis pedis pulses are 2+ on the right side and 2+ on the left side.        Posterior tibial pulses are 2+ on the right side and 2+ on the left side.   Pulmonary/Chest: Breath sounds normal. She has no decreased breath sounds. She has no wheezes. She has no rhonchi. She has no rales.   Abdominal: Soft. She exhibits no distension. There is no abdominal tenderness. There is no rigidity.   Musculoskeletal: Normal range of motion.   Neurological: She is alert and oriented to person, place, and time.   Skin: No rash noted. No erythema.   Psychiatric: Her speech is normal.         ED Course   Procedures  Labs Reviewed   CBC W/ AUTO DIFFERENTIAL - Abnormal; Notable for the following components:       Result Value    WBC 14.81 (*)     Immature Grans (Abs) 0.06 (*)     Lymph # 6.2 (*)     All other components within normal limits   BASIC METABOLIC PANEL   TROPONIN I          Imaging Results    None          Medical Decision Making:   History:   Old  Medical Records: I decided to obtain old medical records.  Independently Interpreted Test(s):   I have ordered and independently interpreted EKG Reading(s) - see prior notes  Clinical Tests:   Lab Tests: Ordered and Reviewed  Medical Tests: Ordered and Reviewed            Scribe Attestation:   Scribe #1: I performed the above scribed service and the documentation accurately describes the services I performed. I attest to the accuracy of the note.    I, Dr. Miquel Holliday, personally performed the services described in this documentation. All medical record entries made by the scribe were at my direction and in my presence.  I have reviewed the chart and agree that the record reflects my personal performance and is accurate and complete. Miquel Holliday MD.  7:21 PM 10/25/2020    Rosanna Marie is a 59 y.o. female presenting with multiple complaints including 3 days of watery, nonbloody diarrhea as well as continuation of chronic atypical chest pain.  Low risk HEART score.  I have very low suspicion for other emergent intrathoracic process such as aortic dissection or PE.  Nonischemic EKG without sign of arrhythmia or ischemia and negative cardiac biomarker.  I do not think she requires admission for cardiac monitoring or serial cardiac biomarker.  She is appropriate for outpatient cardiology follow-up.  In regard to diarrhea, I suspect viral etiology.  No high-risk features for bacterial infectious etiologies.  There is a benign abdominal exam with very low suspicion for emergent intra-abdominal process such as appendicitis, abscess, obstruction.  I do not think further abdominal imaging or surgical consultation is indicated.  Oral rehydration for home reviewed.  Mild leukocytosis is nonspecific and may support viral etiology.  Follow-up with PCP as well.  Detailed return precautions reviewed.        ED Course as of Oct 25 1923   Sun Oct 25, 2020   1727 EKG: Sinus bradycardia, rate of 57, normal  intervals and axis.  Mild motion artifact in III. There are no acute ST or T wave changes suggestive of acute ischemia or infarction.      [MR]      ED Course User Index  [MR] Miquel Holliday MD            Clinical Impression:     ICD-10-CM ICD-9-CM   1. Diarrhea of presumed infectious origin  R19.7 009.3   2. Chest pain  R07.9 786.50                      Disposition:   Disposition: Discharged  Condition: Stable     ED Disposition Condition    Discharge Stable        ED Prescriptions     None        Follow-up Information     Follow up With Specialties Details Why Contact Info    Patric Ruiz MD PhD [facility]IOLOGY  or your regular cardiologist, 3-5 days [address]  [address]  [place] [address]  [phone]      [facility]   or your regular PCP,  3-5 days [address]  [place] [address]  [phone]                                         Miquel Holliday MD  10/25/20 1923

## 2022-02-23 ENCOUNTER — OFFICE VISIT (OUTPATIENT)
Dept: URGENT CARE | Facility: CLINIC | Age: 62
End: 2022-02-23
Payer: MEDICAID

## 2022-02-23 VITALS
HEART RATE: 57 BPM | RESPIRATION RATE: 20 BRPM | BODY MASS INDEX: 25.99 KG/M2 | WEIGHT: 156 LBS | OXYGEN SATURATION: 99 % | TEMPERATURE: 98 F | HEIGHT: 65 IN | DIASTOLIC BLOOD PRESSURE: 72 MMHG | SYSTOLIC BLOOD PRESSURE: 120 MMHG

## 2022-02-23 DIAGNOSIS — Z20.822 ENCOUNTER FOR LABORATORY TESTING FOR COVID-19 VIRUS: Primary | ICD-10-CM

## 2022-02-23 LAB
CTP QC/QA: YES
SARS-COV-2 AG RESP QL IA.RAPID: NEGATIVE

## 2022-02-23 PROCEDURE — 1159F MED LIST DOCD IN RCRD: CPT | Mod: CPTII,S$GLB,, | Performed by: NURSE PRACTITIONER

## 2022-02-23 PROCEDURE — 3078F DIAST BP <80 MM HG: CPT | Mod: CPTII,S$GLB,, | Performed by: NURSE PRACTITIONER

## 2022-02-23 PROCEDURE — 1160F RVW MEDS BY RX/DR IN RCRD: CPT | Mod: CPTII,S$GLB,, | Performed by: NURSE PRACTITIONER

## 2022-02-23 PROCEDURE — 99202 OFFICE O/P NEW SF 15 MIN: CPT | Mod: S$GLB,,, | Performed by: NURSE PRACTITIONER

## 2022-02-23 PROCEDURE — 3008F PR BODY MASS INDEX (BMI) DOCUMENTED: ICD-10-PCS | Mod: CPTII,S$GLB,, | Performed by: NURSE PRACTITIONER

## 2022-02-23 PROCEDURE — 99202 PR OFFICE/OUTPT VISIT, NEW, LEVL II, 15-29 MIN: ICD-10-PCS | Mod: S$GLB,,, | Performed by: NURSE PRACTITIONER

## 2022-02-23 PROCEDURE — 1160F PR REVIEW ALL MEDS BY PRESCRIBER/CLIN PHARMACIST DOCUMENTED: ICD-10-PCS | Mod: CPTII,S$GLB,, | Performed by: NURSE PRACTITIONER

## 2022-02-23 PROCEDURE — 1159F PR MEDICATION LIST DOCUMENTED IN MEDICAL RECORD: ICD-10-PCS | Mod: CPTII,S$GLB,, | Performed by: NURSE PRACTITIONER

## 2022-02-23 PROCEDURE — 3008F BODY MASS INDEX DOCD: CPT | Mod: CPTII,S$GLB,, | Performed by: NURSE PRACTITIONER

## 2022-02-23 PROCEDURE — 87811 SARS-COV-2 COVID19 W/OPTIC: CPT | Mod: S$GLB,,, | Performed by: NURSE PRACTITIONER

## 2022-02-23 PROCEDURE — 3078F PR MOST RECENT DIASTOLIC BLOOD PRESSURE < 80 MM HG: ICD-10-PCS | Mod: CPTII,S$GLB,, | Performed by: NURSE PRACTITIONER

## 2022-02-23 PROCEDURE — 87811 SARS CORONAVIRUS 2 ANTIGEN POCT, MANUAL READ: ICD-10-PCS | Mod: S$GLB,,, | Performed by: NURSE PRACTITIONER

## 2022-02-23 PROCEDURE — 3074F SYST BP LT 130 MM HG: CPT | Mod: CPTII,S$GLB,, | Performed by: NURSE PRACTITIONER

## 2022-02-23 PROCEDURE — 3074F PR MOST RECENT SYSTOLIC BLOOD PRESSURE < 130 MM HG: ICD-10-PCS | Mod: CPTII,S$GLB,, | Performed by: NURSE PRACTITIONER

## 2022-02-23 NOTE — PROGRESS NOTES
"Subjective:       Patient ID: Rosanna Marie is a 61 y.o. female.    Vitals:  height is 5' 5" (1.651 m) and weight is 70.8 kg (156 lb). Her temperature is 98 °F (36.7 °C). Her blood pressure is 120/72 and her pulse is 57 (abnormal). Her respiration is 20 and oxygen saturation is 99%.     Chief Complaint: COVID-19 Concerns (Pt's doctor told her to get a rapid test for her procedure tomorrow. Her doctor recently had Covid and she was exposed. )    Patient is scheduled for surgery tomorrow and requiring a COVID test    ROS    Objective:      Physical Exam   Constitutional: She is oriented to person, place, and time. She appears well-developed.  Non-toxic appearance. She does not appear ill. No distress.   HENT:   Head: Normocephalic and atraumatic.   Mouth/Throat: Oropharynx is clear and moist.   Eyes: Conjunctivae and EOM are normal.   Cardiovascular: Normal rate, regular rhythm and normal heart sounds.   Pulmonary/Chest: Effort normal and breath sounds normal.   Abdominal: Normal appearance.   Neurological: She is alert and oriented to person, place, and time.   Skin: Skin is warm, dry and not diaphoretic. Capillary refill takes 2 to 3 seconds.   Psychiatric: Her behavior is normal. Mood normal.   Nursing note and vitals reviewed.        Assessment:       1. Encounter for laboratory testing for COVID-19 virus          Plan:         Encounter for laboratory testing for COVID-19 virus  -     SARS Coronavirus 2 Antigen, POCT Manual Read                   "

## 2022-02-24 PROBLEM — D49.511 NEOPLASM OF RIGHT KIDNEY: Status: ACTIVE | Noted: 2022-02-24

## 2022-04-03 ENCOUNTER — OFFICE VISIT (OUTPATIENT)
Dept: URGENT CARE | Facility: CLINIC | Age: 62
End: 2022-04-03
Payer: MEDICAID

## 2022-04-03 VITALS
TEMPERATURE: 98 F | OXYGEN SATURATION: 96 % | WEIGHT: 158 LBS | HEIGHT: 65 IN | SYSTOLIC BLOOD PRESSURE: 103 MMHG | RESPIRATION RATE: 18 BRPM | DIASTOLIC BLOOD PRESSURE: 70 MMHG | BODY MASS INDEX: 26.33 KG/M2 | HEART RATE: 55 BPM

## 2022-04-03 DIAGNOSIS — L25.9 CONTACT DERMATITIS, UNSPECIFIED CONTACT DERMATITIS TYPE, UNSPECIFIED TRIGGER: Primary | ICD-10-CM

## 2022-04-03 DIAGNOSIS — L03.90 CELLULITIS, UNSPECIFIED CELLULITIS SITE: ICD-10-CM

## 2022-04-03 PROCEDURE — 3078F DIAST BP <80 MM HG: CPT | Mod: CPTII,S$GLB,,

## 2022-04-03 PROCEDURE — 3074F PR MOST RECENT SYSTOLIC BLOOD PRESSURE < 130 MM HG: ICD-10-PCS | Mod: CPTII,S$GLB,,

## 2022-04-03 PROCEDURE — 99213 OFFICE O/P EST LOW 20 MIN: CPT | Mod: S$GLB,,,

## 2022-04-03 PROCEDURE — 1160F PR REVIEW ALL MEDS BY PRESCRIBER/CLIN PHARMACIST DOCUMENTED: ICD-10-PCS | Mod: CPTII,S$GLB,,

## 2022-04-03 PROCEDURE — 1160F RVW MEDS BY RX/DR IN RCRD: CPT | Mod: CPTII,S$GLB,,

## 2022-04-03 PROCEDURE — 3074F SYST BP LT 130 MM HG: CPT | Mod: CPTII,S$GLB,,

## 2022-04-03 PROCEDURE — 3008F BODY MASS INDEX DOCD: CPT | Mod: CPTII,S$GLB,,

## 2022-04-03 PROCEDURE — 3008F PR BODY MASS INDEX (BMI) DOCUMENTED: ICD-10-PCS | Mod: CPTII,S$GLB,,

## 2022-04-03 PROCEDURE — 3078F PR MOST RECENT DIASTOLIC BLOOD PRESSURE < 80 MM HG: ICD-10-PCS | Mod: CPTII,S$GLB,,

## 2022-04-03 PROCEDURE — 1159F PR MEDICATION LIST DOCUMENTED IN MEDICAL RECORD: ICD-10-PCS | Mod: CPTII,S$GLB,,

## 2022-04-03 PROCEDURE — 1159F MED LIST DOCD IN RCRD: CPT | Mod: CPTII,S$GLB,,

## 2022-04-03 PROCEDURE — 99213 PR OFFICE/OUTPT VISIT, EST, LEVL III, 20-29 MIN: ICD-10-PCS | Mod: S$GLB,,,

## 2022-04-03 RX ORDER — TRIAMCINOLONE ACETONIDE 1 MG/ML
LOTION TOPICAL 2 TIMES DAILY
Qty: 1 EACH | Refills: 0 | Status: SHIPPED | OUTPATIENT
Start: 2022-04-03 | End: 2022-04-13

## 2022-04-03 RX ORDER — DOXYCYCLINE HYCLATE 100 MG
100 TABLET ORAL 2 TIMES DAILY
Qty: 20 TABLET | Refills: 0 | Status: SHIPPED | OUTPATIENT
Start: 2022-04-03 | End: 2022-04-13

## 2022-04-03 NOTE — PROGRESS NOTES
"    Subjective       Patient ID: Rosanna Marie is a 61 y.o. female.    Vitals:  height is 5' 5" (1.651 m) and weight is 71.7 kg (158 lb). Her oral temperature is 97.7 °F (36.5 °C). Her blood pressure is 103/70 and her pulse is 55 (abnormal). Her respiration is 18 and oxygen saturation is 96%.     Chief Complaint: Hand Pain    Pt states " her hand was itchy last night and she scratched it all night long, she took benadrly this morning and applied benadryl cream to it which seems to have helped."    Hand Pain   The incident occurred 3 to 6 hours ago. The incident occurred at home. The injury mechanism is unknown. Pertinent negatives include no chest pain.       Constitution: Negative for activity change, appetite change, chills, sweating and fever.   HENT: Negative for ear pain, sinus pain, sinus pressure and sore throat.    Neck: Negative for neck pain.   Cardiovascular: Negative for chest pain.   Eyes: Negative for blurred vision.   Respiratory: Negative for chest tightness, cough and shortness of breath.    Gastrointestinal: Negative for abdominal pain.   Skin: Positive for rash (left hand/wrist).   Neurological: Negative for dizziness and history of vertigo.       Objective:      Physical Exam   Constitutional: She is oriented to person, place, and time.  Non-toxic appearance. She does not appear ill. No distress.   HENT:   Head: Normocephalic.   Nose: Nose normal.   Eyes: Conjunctivae are normal.      extraocular movement intact   Cardiovascular: Normal rate, normal heart sounds and normal pulses.   Pulmonary/Chest: Effort normal and breath sounds normal. No respiratory distress.   Abdominal: Normal appearance. Soft. There is no abdominal tenderness.   Neurological: no focal deficit. She is alert and oriented to person, place, and time.   Skin: Skin is not diaphoretic. Capillary refill takes 2 to 3 seconds.   Psychiatric: Her behavior is normal. Mood normal.   Vitals reviewed.    Patient has a erythremic " rash to the left hand with some slight swelling to the hand. Normal radial pulse. Sensation intact. Patient reports it as pruritic in nature. No drainage or puss noted on exam. No open wounds noted. Denies any trauma or cuts/abrasions.         Assessment:       1. Contact dermatitis, unspecified contact dermatitis type, unspecified trigger    2. Cellulitis, unspecified cellulitis site          Plan:         Contact dermatitis, unspecified contact dermatitis type, unspecified trigger  -     triamcinolone acetonide 0.1% (KENALOG) 0.1 % Lotn; Apply topically 2 (two) times daily. for 10 days  Dispense: 1 each; Refill: 0    Cellulitis, unspecified cellulitis site  -     doxycycline (VIBRA-TABS) 100 MG tablet; Take 1 tablet (100 mg total) by mouth 2 (two) times daily. for 10 days  Dispense: 20 tablet; Refill: 0         Patient presents with pruritic rash after scratching it all night. Difficult to determine between contact dermatitis or early cellulitis, will cover for both. Patient denies ever having any DVT or blood clots in her extremities. Patient has an appointment with her cardiologist on 04/06. Patient informed to follow up with her PCP in 2-4 days. She was given strict ed precautions if any of her symptoms in her left hand/wrist worsen. Patient verbalized understanding.

## 2022-04-03 NOTE — PATIENT INSTRUCTIONS
Monitor for signs of infection, if you develop increased pain, swelling, redness or worsening of symptoms go to the emergency room.     Take antibiotics with food. Get over counter probiotic.   Follow up with your cardiologist appointment that is scheduled on 04/06 as discussed.   Follow up with PCP in 2-5 days, sooner if worsen.     Take daily Claritin or zyrtec.